# Patient Record
Sex: MALE | URBAN - NONMETROPOLITAN AREA
[De-identification: names, ages, dates, MRNs, and addresses within clinical notes are randomized per-mention and may not be internally consistent; named-entity substitution may affect disease eponyms.]

---

## 2017-01-01 ENCOUNTER — APPOINTMENT (OUTPATIENT)
Age: 62
Setting detail: DERMATOLOGY
End: 2017-01-01

## 2017-01-01 VITALS — HEART RATE: 97 BPM | DIASTOLIC BLOOD PRESSURE: 76 MMHG | SYSTOLIC BLOOD PRESSURE: 199 MMHG

## 2017-01-01 PROCEDURE — G6001 ECHO GUIDANCE RADIOTHERAPY: HCPCS

## 2017-01-01 PROCEDURE — 77401 RADIATION TX DELIVERY SUPFC: CPT

## 2017-01-01 PROCEDURE — OTHER TREATMENT REGIMEN: OTHER

## 2017-01-01 PROCEDURE — 77280 THER RAD SIMULAJ FIELD SMPL: CPT

## 2017-01-01 PROCEDURE — 77331 SPECIAL RADIATION DOSIMETRY: CPT

## 2017-01-01 PROCEDURE — OTHER FOLLOW UP FOR NEXT VISIT: OTHER

## 2017-01-01 PROCEDURE — OTHER SUPERFICIAL RADIATION TREATMENT: OTHER

## 2017-01-01 PROCEDURE — 99214 OFFICE O/P EST MOD 30 MIN: CPT | Mod: 25

## 2017-01-01 PROCEDURE — 77427 RADIATION TX MANAGEMENT X5: CPT

## 2017-01-04 PROBLEM — C44.629 SQUAMOUS CELL CARCINOMA OF SKIN OF LEFT UPPER LIMB, INCLUDING SHOULDER: Status: ACTIVE | Noted: 2017-01-01

## 2017-01-04 PROBLEM — C44.329 SQUAMOUS CELL CARCINOMA OF SKIN OF OTHER PARTS OF FACE: Status: ACTIVE | Noted: 2017-01-01

## 2017-01-04 NOTE — PROCEDURE: SUPERFICIAL RADIATION TREATMENT
Daily Fractionated Dose (Optional- Include Units) Rx 2: 255 cGy
Initial Radiation Treatment Planning (Will Render If Bill Simulation = Yes): The patient had a complete consultation regarding all applicable modalities for the treatment of their skin cancer and based on a variety of factors including the type of tumor, size, and location, the relevant medical history as well as local tissue factors, the functional status of the individual, the ability to perform necessary postoperative wound instructions and the need for simultaneous treatments as well as overall wound healing status, it was determined that the patient would begin radiation therapy treatment for skin cancer.  A full simulation and treatment device design was performed including the determination and formulation of appropriate simple and complex devices including lead shield of 0.762 mm thickness to form molded customized shielding to specifically correlate with the lesion size including treatment margin.  The custom lead shield is adequate to accommodate the appropriate applicator and provide adequate shielding around the treatment site.  The specific field applicator, shields, and devices both simple and complex as well as the specific patient setup is outlined below.  The patient was given a full consent for superficial radiation to both verbally and in writing and the full determination of patient's eligibility for treatment and selection is outlined on the patient eligibility and treatment selection form.  The specific superficial radiotherapy prescription was determined and was documented on the superficial radiotherapy prescription form.  A treatment calculation was also performed and documented on the treatment calculation form.  Based on the prescription, the patient was scheduled for a series of fractional treatments.
Simple Simulation Preamble Text Will Be Included With Simple Simulations (.......... Indications): Simple simulation was performed today for the following reasons:
Render Additional Prescriptions In Note?: No
Cumulative Dose In Cgy (Optional): 1256.0
Shielding Size (Optional- Include Units) Rx 2: 2.5 x 2.5
Shielding Size (Optional- Include Units): 3 x 3
Fractions / Week Rx 3: 5
Custom Shielding Preamble Text Will Not Be Included With Simple Simulations (.......... X X Y Cm): A lead shield of 0.762 mm thickness is utilized to form a molded, custom shield with a
Field Size (Applicator): 3.0 cm
Energy (Include Units): 50kV
Day Of The Week Treatment Administered: Tuesday
Computed Treatment Time In Min (Will Render The Same As Calculated Treatment Time If Left Blank): per device
Additional Prescription Justification Text: If there is any interruption in treatment exceeding 5 days please see Decay and Dose Adjustment Calculation and complete treatment under Prescription 2.
Total Number Of Fractions Rx 2: 15
Dose / Tx In Cgy (Optional): 253.11
Fractions / Week: 3
Treatment Margins In Cm: 1
Energy (Optional-Please Include Units) Rx 2: 50 kV
Bill And Render Text From Evaluation And Management Tab (Will Bill [id number]): Yes
Field Size (Applicator): 4.0 cm
Total Number Of Fractions: 20
Patient Positioning: Sitting
Depth (Optional-Please Include Units): surface
Fractionation Number (Evaluation): 10
Port Dimensions-X Axis In Cm: 0
Dimensions-X Axis In Cm: 2
Functional Status: 0 (fully active)
Treatment Margins In Cm: 0.5
Dose / Tx In Cgy (Optional): 254.98
Dose Per Fractionation In Cgy (Optional): 255
Total Dose (Optional-Please Include Units): 5,100 cGy
Assessment: Appropriate reaction
Fractions / Week Rx 2: 4
Custom Shielding Afterword Text Will Not Be Included With Simple Simulations (X X Y Cm............): port to correlate with the lesion size, including treatment margin. The custom lead shield is adequate to accommodate the appropriate applicator and provide adequate shielding around the treatment site. Additional shielding (as noted below) is used to protect sensitive, normal tissues.
Intro Statement (Will Not Render If Left Blank): The patient is undergoing superficial radiation therapy for skin cancer and presents for weekly evaluation and management.  Per protocol and as documented on the flow sheet, the patient was questioned as to subjective redness, pruritus, pain, drainage, fatigue, or any other symptoms.  Objectively, the radiation area was evaluated with regards to erythema, atrophy, scale, crusting, erosion, ulceration, edema, purpura, tenderness, warmth, drainage, and any other findings.  The plan was extensively reviewed including the dose, and dosing schedule.  The simulation and clinical setup was also reviewed as was the external and any internal shields and based on this review the appropriateness and sufficiency of treatment was determined.
Treatment Device Design After Initial Simulation Justification (Will Render If Bill For Treatment Devices = Yes): The patient is status post radiation simulation and is evaluated as to the use of additional devices for shielding and placement for radiation therapy.
Treatment Time In Min (Optional): 0.33
Treatment Time In Min (Optional): 0.38
Dimensions-Y Axis In Cm: 2.5
Detail Level: Detailed
Energy (Include Units): 70kV
Treatment Time In Min (Optional): 0.32
Cumulative Dose In Cgy (Optional): 1274.9
Dose / Tx In Cgy (Optional): 251.2
Cumulative Dose In Cgy (Optional): 1265.55
Dimensions-Y Axis In Cm: 1.5

## 2017-01-05 PROBLEM — C44.329 SQUAMOUS CELL CARCINOMA OF SKIN OF OTHER PARTS OF FACE: Status: ACTIVE | Noted: 2017-01-01

## 2017-01-05 PROBLEM — C44.629 SQUAMOUS CELL CARCINOMA OF SKIN OF LEFT UPPER LIMB, INCLUDING SHOULDER: Status: ACTIVE | Noted: 2017-01-01

## 2017-01-05 NOTE — PROCEDURE: TREATMENT REGIMEN
Detail Level: Zone
Plan: ULTRASOUND DEPTH N/A DUE TO LESION LOCATION\\nRT EYE DOSIMETER READING 0.14 CGY

## 2017-01-05 NOTE — PROCEDURE: TREATMENT REGIMEN
Plan: Patient's treatment areas were evaluated based on current prescribed dose for response and reaction by radiation therapist. Appropriate response and reaction noted.
Detail Level: Zone
Plan: Tumor depth on US1.62mm
Plan: inflammation depth on US 1.06 mm
Plan: Ultrasound depth N/A\\nRt eye dosimeter readin.14cGy

## 2017-01-05 NOTE — PROCEDURE: SUPERFICIAL RADIATION TREATMENT
Additional Prescription Justification Text: If there is any interruption in treatment exceeding 5 days please see Decay and Dose Adjustment Calculation and complete treatment under Prescription 2.
Fractions / Week Rx 4: 5
Total Dose (Optional-Please Include Units): 5,100 cGy
Initial Radiation Treatment Planning (Will Render If Bill Simulation = Yes): The patient had a complete consultation regarding all applicable modalities for the treatment of their skin cancer and based on a variety of factors including the type of tumor, size, and location, the relevant medical history as well as local tissue factors, the functional status of the individual, the ability to perform necessary postoperative wound instructions and the need for simultaneous treatments as well as overall wound healing status, it was determined that the patient would begin radiation therapy treatment for skin cancer.  A full simulation and treatment device design was performed including the determination and formulation of appropriate simple and complex devices including lead shield of 0.762 mm thickness to form molded customized shielding to specifically correlate with the lesion size including treatment margin.  The custom lead shield is adequate to accommodate the appropriate applicator and provide adequate shielding around the treatment site.  The specific field applicator, shields, and devices both simple and complex as well as the specific patient setup is outlined below.  The patient was given a full consent for superficial radiation to both verbally and in writing and the full determination of patient's eligibility for treatment and selection is outlined on the patient eligibility and treatment selection form.  The specific superficial radiotherapy prescription was determined and was documented on the superficial radiotherapy prescription form.  A treatment calculation was also performed and documented on the treatment calculation form.  Based on the prescription, the patient was scheduled for a series of fractional treatments.
Bill And Render Text From Evaluation And Management Tab (Will Bill [id number]): No
Port Dimensions-X Axis In Cm: 0
Field Size (Applicator): 4.0 cm
Daily Fractionated Dose (Optional- Include Units) Rx 2: 255 cGy
Shielding Size (Optional- Include Units) Rx 2: 2.5 x 2.5
Number Of Treatment Days: 1
Energy (Optional-Please Include Units) Rx 2: 50 kV
Assessment: Appropriate reaction
Energy (Include Units): 50kV
Custom Shielding Preamble Text Will Not Be Included With Simple Simulations (.......... X X Y Cm): A lead shield of 0.762 mm thickness is utilized to form a molded, custom shield with a
Fractions / Week Rx 2: 4
Field Number: 2
Fractionation Number: 11
Depth (Optional-Please Include Units): surface
Shielding Size (Optional- Include Units): 3 x 3
Day Of The Week Treatment Administered: Thursday
Total Number Of Fractions Rx 4: 15
Fractions / Week: 3
Total Number Of Fractions: 20
Dose / Tx In Cgy (Optional): 253.11
Intro Statement (Will Not Render If Left Blank): The patient is undergoing superficial radiation therapy for skin cancer and presents for weekly evaluation and management.  Per protocol and as documented on the flow sheet, the patient was questioned as to subjective redness, pruritus, pain, drainage, fatigue, or any other symptoms.  Objectively, the radiation area was evaluated with regards to erythema, atrophy, scale, crusting, erosion, ulceration, edema, purpura, tenderness, warmth, drainage, and any other findings.  The plan was extensively reviewed including the dose, and dosing schedule.  The simulation and clinical setup was also reviewed as was the external and any internal shields and based on this review the appropriateness and sufficiency of treatment was determined.
Simple Simulation Preamble Text Will Be Included With Simple Simulations (.......... Indications): Simple simulation was performed today for the following reasons:
Cumulative Dose In Cgy (Optional): 2763.2
Functional Status: 0 (fully active)
Computed Treatment Time In Min (Will Render The Same As Calculated Treatment Time If Left Blank): per device
Field Size (Applicator) Rx 2: 3.0 cm
Bill For Radiation Treatment: Yes
Treatment Device Design After Initial Simulation Justification (Will Render If Bill For Treatment Devices = Yes): The patient is status post radiation simulation and is evaluated as to the use of additional devices for shielding and placement for radiation therapy.
Custom Shielding Afterword Text Will Not Be Included With Simple Simulations (X X Y Cm............): port to correlate with the lesion size, including treatment margin. The custom lead shield is adequate to accommodate the appropriate applicator and provide adequate shielding around the treatment site. Additional shielding (as noted below) is used to protect sensitive, normal tissues.
Patient Positioning: Sitting
Dose / Tx In Cgy (Optional): 251.2
Detail Level: Detailed
Cumulative Dose In Cgy (Optional): 2784.21
Computed Treatment Time In Min (Will Render The Same As Calculated Treatment Time If Left Blank): 0.32
Treatment Time In Min (Optional): 0.33
Treatment Margins In Cm: 0.5
Cumulative Dose In Cgy (Optional): 2782.1
Dimensions-Y Axis In Cm: 2.5
Dose Per Fractionation In Cgy (Optional): 255
Dimensions-Y Axis In Cm: 1.5

## 2017-01-05 NOTE — PROCEDURE: SUPERFICIAL RADIATION TREATMENT
Functional Status: 1 (ambulatory, light activity)
Daily Fractionated Dose (Optional- Include Units): 253.11
Fractionation Number: 11
Total Number Of Fractions Rx 3: 15
Field Size (Applicator): 2.5 cm
Render Additional Prescriptions In Note?: No
Number Of Treatment Days: 1
Port Dimensions-Y Axis In Cm: 0
Cumulative Dose In Cgy (Optional): 2784.21
Initial Radiation Treatment Planning (Will Render If Bill Simulation = Yes): The patient had a complete consultation regarding all applicable modalities for the treatment of their skin cancer and based on a variety of factors including the type of tumor, size, and location, the relevant medical history as well as local tissue factors, the functional status of the individual, the ability to perform necessary postoperative wound instructions and the need for simultaneous treatments as well as overall wound healing status, it was determined that the patient would begin radiation therapy treatment for skin cancer.  A full simulation and treatment device design was performed including the determination and formulation of appropriate simple and complex devices including lead shield of 0.762 mm thickness to form molded customized shielding to specifically correlate with the lesion size including treatment margin.  The custom lead shield is adequate to accommodate the appropriate applicator and provide adequate shielding around the treatment site.  The specific field applicator, shields, and devices both simple and complex as well as the specific patient setup is outlined below.  The patient was given a full consent for superficial radiation to both verbally and in writing and the full determination of patient's eligibility for treatment and selection is outlined on the patient eligibility and treatment selection form.  The specific superficial radiotherapy prescription was determined and was documented on the superficial radiotherapy prescription form.  A treatment calculation was also performed and documented on the treatment calculation form.  Based on the prescription, the patient was scheduled for a series of fractional treatments.
Day Of The Week Treatment Administered: Thursday
Shielding Size (Optional- Include Units) Rx 2: None
Fractions / Week Rx 2: 3
Custom Shielding Preamble Text Will Not Be Included With Simple Simulations (.......... X X Y Cm): A lead shield of 0.762 mm thickness is utilized to form a molded, custom shield with a
Additional Prescription Justification Text: If there is any interruption in treatment exceeding 5 days please see Decay and Dose Adjustment Calculation and complete treatment under Prescription 2.
Energy (Optional-Please Include Units): 50kV
Patient Positioning: Sitting
Fractions / Week Rx 4: 5
Treatment Time / Fractionation (Optional- Include Units): .33 min
Total Number Of Fractions Rx 2: 20
Total Dose (Optional-Please Include Units): 5062.2 cGy
Treatment Time In Min (Optional): 0.33
Detail Level: Detailed
Treatment Margins In Cm: 0.5
Body Location Override (Optional): Right superior medial malar cheek
Custom Shielding Afterword Text Will Not Be Included With Simple Simulations (X X Y Cm............): port to correlate with the lesion size, including treatment margin. The custom lead shield is adequate to accommodate the appropriate applicator and provide adequate shielding around the treatment site. Additional shielding (as noted below) is used to protect sensitive, normal tissues.
Intro Statement (Will Not Render If Left Blank): The patient is undergoing superficial radiation therapy for skin cancer and presents for weekly evaluation and management.  Per protocol and as documented on the flow sheet, the patient was questioned as to subjective redness, pruritus, pain, drainage, fatigue, or any other symptoms.  Objectively, the radiation area was evaluated with regards to erythema, atrophy, scale, crusting, erosion, ulceration, edema, purpura, tenderness, warmth, drainage, and any other findings.  The plan was extensively reviewed including the dose, and dosing schedule.  The simulation and clinical setup was also reviewed as was the external and any internal shields and based on this review the appropriateness and sufficiency of treatment was determined.
Treatment Device Design After Initial Simulation Justification (Will Render If Bill For Treatment Devices = Yes): The patient is status post radiation simulation and is evaluated as to the use of additional devices for shielding and placement for radiation therapy.
Assessment: Appropriate reaction
Simple Simulation Preamble Text Will Be Included With Simple Simulations (.......... Indications): Simple simulation was performed today for the following reasons:
Dimensions-Y Axis In Cm: 1.5
Time Dose Fractionation (Optional- Include Units If Applicable): 86
Bill For Radiation Treatment: Yes

## 2017-01-06 PROBLEM — C44.629 SQUAMOUS CELL CARCINOMA OF SKIN OF LEFT UPPER LIMB, INCLUDING SHOULDER: Status: ACTIVE | Noted: 2017-01-01

## 2017-01-06 PROBLEM — E13.9 OTHER SPECIFIED DIABETES MELLITUS WITHOUT COMPLICATIONS: Status: ACTIVE | Noted: 2017-01-01

## 2017-01-06 PROBLEM — C44.329 SQUAMOUS CELL CARCINOMA OF SKIN OF OTHER PARTS OF FACE: Status: ACTIVE | Noted: 2017-01-01

## 2017-01-06 NOTE — PROCEDURE: SUPERFICIAL RADIATION TREATMENT
Fractions / Week Rx 4: 5
Include Rx 2 When Rendering Additional Prescriptions: No
Shielding Size (Optional- Include Units): 3 x 3
Field Size (Applicator): 3.0 cm
Field Size (Applicator): 4.0 cm
Cumulative Dose In Cgy (Optional): 3033.3
Additional Prescription Justification Text: If there is any interruption in treatment exceeding 5 days please see Decay and Dose Adjustment Calculation and complete treatment under Prescription 2.
Intro Statement (Will Not Render If Left Blank): The patient is undergoing superficial radiation therapy for skin cancer and presents for weekly evaluation and management.  Per protocol and as documented on the flow sheet, the patient was questioned as to subjective redness, pruritus, pain, drainage, fatigue, or any other symptoms.  Objectively, the radiation area was evaluated with regards to erythema, atrophy, scale, crusting, erosion, ulceration, edema, purpura, tenderness, warmth, drainage, and any other findings.  The plan was extensively reviewed including the dose, and dosing schedule.  The simulation and clinical setup was also reviewed as was the external and any internal shields and based on this review the appropriateness and sufficiency of treatment was determined.
Field Number: 3
Dose / Tx In Cgy (Optional): 253.11
Bill For Radiation Treatment: Yes
Daily Fractionated Dose (Optional- Include Units) Rx 2: 255 cGy
Port Dimensions-Y Axis In Cm: 0
Custom Shielding Preamble Text Will Not Be Included With Simple Simulations (.......... X X Y Cm): A lead shield of 0.762 mm thickness is utilized to form a molded, custom shield with a
Assessment: Appropriate reaction
Fractionation Number: 12
Dimensions-X Axis In Cm: 2
Prescription Used: 1
Shielding Size (Optional- Include Units) Rx 2: 2.5 x 2.5
Custom Shielding Afterword Text Will Not Be Included With Simple Simulations (X X Y Cm............): port to correlate with the lesion size, including treatment margin. The custom lead shield is adequate to accommodate the appropriate applicator and provide adequate shielding around the treatment site. Additional shielding (as noted below) is used to protect sensitive, normal tissues.
Energy (Optional-Please Include Units): 50kV
Energy (Optional-Please Include Units) Rx 2: 50 kV
Initial Radiation Treatment Planning (Will Render If Bill Simulation = Yes): The patient had a complete consultation regarding all applicable modalities for the treatment of their skin cancer and based on a variety of factors including the type of tumor, size, and location, the relevant medical history as well as local tissue factors, the functional status of the individual, the ability to perform necessary postoperative wound instructions and the need for simultaneous treatments as well as overall wound healing status, it was determined that the patient would begin radiation therapy treatment for skin cancer.  A full simulation and treatment device design was performed including the determination and formulation of appropriate simple and complex devices including lead shield of 0.762 mm thickness to form molded customized shielding to specifically correlate with the lesion size including treatment margin.  The custom lead shield is adequate to accommodate the appropriate applicator and provide adequate shielding around the treatment site.  The specific field applicator, shields, and devices both simple and complex as well as the specific patient setup is outlined below.  The patient was given a full consent for superficial radiation to both verbally and in writing and the full determination of patient's eligibility for treatment and selection is outlined on the patient eligibility and treatment selection form.  The specific superficial radiotherapy prescription was determined and was documented on the superficial radiotherapy prescription form.  A treatment calculation was also performed and documented on the treatment calculation form.  Based on the prescription, the patient was scheduled for a series of fractional treatments.
Fractions / Week Rx 2: 4
Total Number Of Fractions: 20
Treatment Time In Min (Optional): 0.32
Dose / Tx In Cgy (Optional): 251.2
Cumulative Dose In Cgy (Optional): 3014.4
Computed Treatment Time In Min (Will Render The Same As Calculated Treatment Time If Left Blank): per device
Treatment Device Design After Initial Simulation Justification (Will Render If Bill For Treatment Devices = Yes): The patient is status post radiation simulation and is evaluated as to the use of additional devices for shielding and placement for radiation therapy.
Cumulative Dose In Cgy (Optional): 3037.32
Total Number Of Fractions Rx 2: 15
Day Of The Week Treatment Administered: Friday
Total Dose (Optional-Please Include Units): 5,100 cGy
Treatment Time In Min (Optional): 0.33
Depth (Optional-Please Include Units): surface
Detail Level: Detailed
Simple Simulation Preamble Text Will Be Included With Simple Simulations (.......... Indications): Simple simulation was performed today for the following reasons:
Treatment Margins In Cm: 0.5
Dimensions-Y Axis In Cm: 1.5
Patient Positioning: Sitting
Functional Status: 0 (fully active)
Dose Per Fractionation In Cgy (Optional): 255
Dimensions-Y Axis In Cm: 2.5

## 2017-01-06 NOTE — PROCEDURE: TREATMENT REGIMEN
Plan: Ultrasound depth N/A\\nRt eye dosimeter readin.21cGy
Plan: Tumor depth on US1.35mm
Detail Level: Zone
Plan: inflammation depth on US 0.87mm
Plan: Patient's treatment areas were evaluated based on current prescribed dose for response and reaction by radiation therapist. Appropriate response and reaction noted.

## 2017-01-16 PROBLEM — C44.329 SQUAMOUS CELL CARCINOMA OF SKIN OF OTHER PARTS OF FACE: Status: ACTIVE | Noted: 2017-01-01

## 2017-01-16 PROBLEM — C44.629 SQUAMOUS CELL CARCINOMA OF SKIN OF LEFT UPPER LIMB, INCLUDING SHOULDER: Status: ACTIVE | Noted: 2017-01-01

## 2017-01-16 NOTE — PROCEDURE: SUPERFICIAL RADIATION TREATMENT
Intro Statement (Will Not Render If Left Blank): The patient is undergoing superficial radiation therapy for skin cancer and presents for weekly evaluation and management.  Per protocol and as documented on the flow sheet, the patient was questioned as to subjective redness, pruritus, pain, drainage, fatigue, or any other symptoms.  Objectively, the radiation area was evaluated with regards to erythema, atrophy, scale, crusting, erosion, ulceration, edema, purpura, tenderness, warmth, drainage, and any other findings.  The plan was extensively reviewed including the dose, and dosing schedule.  The simulation and clinical setup was also reviewed as was the external and any internal shields and based on this review the appropriateness and sufficiency of treatment was determined.
Treatment Time In Min (Optional): 0.32
Energy (Optional-Please Include Units): 50kV
Bill For Treatment Devices Only: No
Number Of Treatment Days: 1
Fractions / Week Rx 3: 5
Simple Simulation Preamble Text Will Be Included With Simple Simulations (.......... Indications): Simple simulation was performed today for the following reasons:
Shielding Size (Optional- Include Units): 3 x 3
Bill For Radiation Treatment: Yes
Port Dimensions-Y Axis In Cm: 0
Day Of The Week Treatment Administered: Monday
Dose / Tx In Cgy (Optional): 251.2
Assessment: Appropriate reaction
Computed Treatment Time In Min (Will Render The Same As Calculated Treatment Time If Left Blank): per device
Field Size (Applicator): 4.0 cm
Fractions / Week Rx 2: 4
Functional Status: 0 (fully active)
Custom Shielding Preamble Text Will Not Be Included With Simple Simulations (.......... X X Y Cm): A lead shield of 0.762 mm thickness is utilized to form a molded, custom shield with a
Daily Fractionated Dose (Optional- Include Units) Rx 2: 255 cGy
Depth (Optional-Please Include Units): surface
Custom Shielding Afterword Text Will Not Be Included With Simple Simulations (X X Y Cm............): port to correlate with the lesion size, including treatment margin. The custom lead shield is adequate to accommodate the appropriate applicator and provide adequate shielding around the treatment site. Additional shielding (as noted below) is used to protect sensitive, normal tissues.
Total Number Of Fractions Rx 2: 15
Initial Radiation Treatment Planning (Will Render If Bill Simulation = Yes): The patient had a complete consultation regarding all applicable modalities for the treatment of their skin cancer and based on a variety of factors including the type of tumor, size, and location, the relevant medical history as well as local tissue factors, the functional status of the individual, the ability to perform necessary postoperative wound instructions and the need for simultaneous treatments as well as overall wound healing status, it was determined that the patient would begin radiation therapy treatment for skin cancer.  A full simulation and treatment device design was performed including the determination and formulation of appropriate simple and complex devices including lead shield of 0.762 mm thickness to form molded customized shielding to specifically correlate with the lesion size including treatment margin.  The custom lead shield is adequate to accommodate the appropriate applicator and provide adequate shielding around the treatment site.  The specific field applicator, shields, and devices both simple and complex as well as the specific patient setup is outlined below.  The patient was given a full consent for superficial radiation to both verbally and in writing and the full determination of patient's eligibility for treatment and selection is outlined on the patient eligibility and treatment selection form.  The specific superficial radiotherapy prescription was determined and was documented on the superficial radiotherapy prescription form.  A treatment calculation was also performed and documented on the treatment calculation form.  Based on the prescription, the patient was scheduled for a series of fractional treatments.
Fractionation Number: 13
Treatment Device Design After Initial Simulation Justification (Will Render If Bill For Treatment Devices = Yes): The patient is status post radiation simulation and is evaluated as to the use of additional devices for shielding and placement for radiation therapy.
Energy (Optional-Please Include Units) Rx 2: 50 kV
Shielding Size (Optional- Include Units) Rx 2: 2.5 x 2.5
Patient Positioning: Sitting
Fractions / Week: 3
Dose Per Fractionation In Cgy (Optional): 255
Detail Level: Detailed
Dose / Tx In Cgy (Optional): 253.11
Field Size (Applicator) Rx 2: 3.0 cm
Total Dose (Optional-Please Include Units): 5,100 cGy
Cumulative Dose In Cgy (Optional): 3265.6
Treatment Margins In Cm: 0.5
Treatment Time In Min (Optional): 0.33
Dimensions-Y Axis In Cm: 2.5
Dimensions-Y Axis In Cm: 1.5
Cumulative Dose In Cgy (Optional): 3284.5
Additional Prescription Justification Text: If there is any interruption in treatment exceeding 5 days please see Decay and Dose Adjustment Calculation and complete treatment under Prescription 2.
Field Number: 2
Total Number Of Fractions: 20
Cumulative Dose In Cgy (Optional): 3290.43

## 2017-01-16 NOTE — PROCEDURE: TREATMENT REGIMEN
Detail Level: Zone
Plan: Patient's treatment areas were evaluated based on current prescribed dose for response and reaction by radiation therapist. Appropriate response and reaction noted.
Plan: Ultrasound depth N/A\\nRt eye dosimeter readin.21cGy
Plan: Tumor depth on US1.22mm
Plan: inflammation depth on US 1.16mm

## 2017-01-17 PROBLEM — C44.629 SQUAMOUS CELL CARCINOMA OF SKIN OF LEFT UPPER LIMB, INCLUDING SHOULDER: Status: ACTIVE | Noted: 2017-01-01

## 2017-01-17 PROBLEM — C44.329 SQUAMOUS CELL CARCINOMA OF SKIN OF OTHER PARTS OF FACE: Status: ACTIVE | Noted: 2017-01-01

## 2017-01-17 NOTE — PROCEDURE: TREATMENT REGIMEN
Detail Level: Zone
Plan: Ultrasound depth N/A\\nRt eye dosimeter readin.0 cGy
Plan: inflammation depth on US 0.78 mm
Plan: Patient's treatment areas were evaluated based on current prescribed dose for response and reaction by radiation therapist. Appropriate response and reaction noted.
Plan: Tumor depth on US1.25 mm

## 2017-01-17 NOTE — PROCEDURE: SUPERFICIAL RADIATION TREATMENT
Render Patient Eligibility And Selection In Note?: No
Fractions / Week Rx 4: 5
Bill For Radiation Treatment: Yes
Field Size (Applicator): 4.0 cm
Total Number Of Fractions: 20
Prescription Used: 1
Daily Fractionated Dose (Optional- Include Units) Rx 2: 255 cGy
Simple Simulation Preamble Text Will Be Included With Simple Simulations (.......... Indications): Simple simulation was performed today for the following reasons:
Energy (Include Units): 50kV
Custom Shielding Afterword Text Will Not Be Included With Simple Simulations (X X Y Cm............): port to correlate with the lesion size, including treatment margin. The custom lead shield is adequate to accommodate the appropriate applicator and provide adequate shielding around the treatment site. Additional shielding (as noted below) is used to protect sensitive, normal tissues.
Treatment Device Design After Initial Simulation Justification (Will Render If Bill For Treatment Devices = Yes): The patient is status post radiation simulation and is evaluated as to the use of additional devices for shielding and placement for radiation therapy.
Intro Statement (Will Not Render If Left Blank): The patient is undergoing superficial radiation therapy for skin cancer and presents for weekly evaluation and management.  Per protocol and as documented on the flow sheet, the patient was questioned as to subjective redness, pruritus, pain, drainage, fatigue, or any other symptoms.  Objectively, the radiation area was evaluated with regards to erythema, atrophy, scale, crusting, erosion, ulceration, edema, purpura, tenderness, warmth, drainage, and any other findings.  The plan was extensively reviewed including the dose, and dosing schedule.  The simulation and clinical setup was also reviewed as was the external and any internal shields and based on this review the appropriateness and sufficiency of treatment was determined.
Energy (Optional-Please Include Units) Rx 2: 50 kV
Depth (Optional-Please Include Units): surface
Total Dose (Optional-Please Include Units): 5,100 cGy
Functional Status: 0 (fully active)
Total Number Of Fractions Rx 3: 15
Field Size (Applicator) Rx 2: 3.0 cm
Fractions / Week: 3
Port Dimensions-X Axis In Cm: 0
Treatment Time In Min (Optional): 0.33
Fractions / Week Rx 2: 4
Dimensions-Y Axis In Cm: 1.5
Cumulative Dose In Cgy (Optional): 3516.8
Patient Positioning: Sitting
Simple Simulation Afterword Text Will Be Included With Simple Simulations (Indications............): The patient had a complete consultation regarding all applicable modalities for the treatment of their skin cancer and based on a variety of factors including the type of tumor, size, and location, the relevant medical history as well as local tissue factors, the functional status of the individual, the ability to perform necessary postoperative wound instructions and the need for simultaneous treatments as well as overall wound healing status, it was determined that the patient would begin radiation therapy treatment for skin cancer.  A full simulation and treatment device design was performed including the determination and formulation of appropriate simple and complex devices including lead shield of 0.762 mm thickness to form molded customized shielding to specifically correlate with the lesion size including treatment margin.  The custom lead shield is adequate to accommodate the appropriate applicator and provide adequate shielding around the treatment site.  The specific field applicator, shields, and devices both simple and complex as well as the specific patient setup is outlined below.  The patient was given a full consent for superficial radiation to both verbally and in writing and the full determination of patient's eligibility for treatment and selection is outlined on the patient eligibility and treatment selection form.  The specific superficial radiotherapy prescription was determined and was documented on the superficial radiotherapy prescription form.  A treatment calculation was also performed and documented on the treatment calculation form.  Based on the prescription, the patient was scheduled for a series of fractional treatments.
Assessment: Appropriate reaction
Dose Per Fractionation In Cgy (Optional): 251.2
Custom Shielding Preamble Text Will Not Be Included With Simple Simulations (.......... X X Y Cm): A lead shield of 0.762 mm thickness is utilized to form a molded, custom shield with a
Cumulative Dose In Cgy (Optional): 3535.7
Shielding Size (Optional- Include Units): 3 x 3
Computed Treatment Time In Min (Will Render The Same As Calculated Treatment Time If Left Blank): per device
Detail Level: Detailed
Day Of The Week Treatment Administered: Tuesday
Additional Prescription Justification Text: If there is any interruption in treatment exceeding 5 days please see Decay and Dose Adjustment Calculation and complete treatment under Prescription 2.
Shielding Size (Optional- Include Units) Rx 2: 2.5 x 2.5
Dimensions-X Axis In Cm: 2
Cumulative Dose In Cgy (Optional): 3543.54
Treatment Time In Min (Optional): 0.32
Dimensions-Y Axis In Cm: 2.5
Fractionation Number: 14
Treatment Margins In Cm: 0.5
Dose Per Fractionation In Cgy (Optional): 255
Dose / Tx In Cgy (Optional): 253.11

## 2017-01-18 PROBLEM — C44.629 SQUAMOUS CELL CARCINOMA OF SKIN OF LEFT UPPER LIMB, INCLUDING SHOULDER: Status: ACTIVE | Noted: 2017-01-01

## 2017-01-18 PROBLEM — C44.329 SQUAMOUS CELL CARCINOMA OF SKIN OF OTHER PARTS OF FACE: Status: ACTIVE | Noted: 2017-01-01

## 2017-01-18 NOTE — PROCEDURE: TREATMENT REGIMEN
Detail Level: Zone
Plan: Tumor depth on US 0.74 mm
Plan: Patient's treatment areas were evaluated based on current prescribed dose for response and reaction by radiation therapist. Appropriate response and reaction noted.
Plan: Ultrasound depth N/A\\nRt eye dosimeter readin.0 cGy
Plan: inflammation depth on US 0.78 mm

## 2017-01-18 NOTE — PROCEDURE: SUPERFICIAL RADIATION TREATMENT
Total Dose (Optional-Please Include Units): 5,100 cGy
Daily Fractionated Dose (Optional- Include Units) Rx 2: 255 cGy
Fractions / Week: 3
Energy (Include Units): 50kV
Custom Shielding Preamble Text Will Not Be Included With Simple Simulations (.......... X X Y Cm): A lead shield of 0.762 mm thickness is utilized to form a molded, custom shield with a
Fractions / Week Rx 3: 5
Bill For Treatment Devices Only: No
Field Number: 2
Assessment: Appropriate reaction
Day Of The Week Treatment Administered: Wednesday
Field Size (Applicator) Rx 2: 3.0 cm
Total Number Of Fractions Rx 2: 15
Dose Per Fractionation In Cgy (Optional): 251.2
Custom Shielding Afterword Text Will Not Be Included With Simple Simulations (X X Y Cm............): port to correlate with the lesion size, including treatment margin. The custom lead shield is adequate to accommodate the appropriate applicator and provide adequate shielding around the treatment site. Additional shielding (as noted below) is used to protect sensitive, normal tissues.
Prescription Used: 1
Treatment Device Design After Initial Simulation Justification (Will Render If Bill For Treatment Devices = Yes): The patient is status post radiation simulation and is evaluated as to the use of additional devices for shielding and placement for radiation therapy.
Port Dimensions-X Axis In Cm: 0
Field Size (Applicator): 4.0 cm
Simple Simulation Preamble Text Will Be Included With Simple Simulations (.......... Indications): Simple simulation was performed today for the following reasons:
Energy (Optional-Please Include Units) Rx 2: 50 kV
Intro Statement (Will Not Render If Left Blank): The patient is undergoing superficial radiation therapy for skin cancer and presents for weekly evaluation and management.  Per protocol and as documented on the flow sheet, the patient was questioned as to subjective redness, pruritus, pain, drainage, fatigue, or any other symptoms.  Objectively, the radiation area was evaluated with regards to erythema, atrophy, scale, crusting, erosion, ulceration, edema, purpura, tenderness, warmth, drainage, and any other findings.  The plan was extensively reviewed including the dose, and dosing schedule.  The simulation and clinical setup was also reviewed as was the external and any internal shields and based on this review the appropriateness and sufficiency of treatment was determined.
Shielding Size (Optional- Include Units): 3 x 3
Cumulative Dose In Cgy (Optional): 3768.0
Treatment Margins In Cm: 0.5
Simple Simulation Afterword Text Will Be Included With Simple Simulations (Indications............): The patient had a complete consultation regarding all applicable modalities for the treatment of their skin cancer and based on a variety of factors including the type of tumor, size, and location, the relevant medical history as well as local tissue factors, the functional status of the individual, the ability to perform necessary postoperative wound instructions and the need for simultaneous treatments as well as overall wound healing status, it was determined that the patient would begin radiation therapy treatment for skin cancer.  A full simulation and treatment device design was performed including the determination and formulation of appropriate simple and complex devices including lead shield of 0.762 mm thickness to form molded customized shielding to specifically correlate with the lesion size including treatment margin.  The custom lead shield is adequate to accommodate the appropriate applicator and provide adequate shielding around the treatment site.  The specific field applicator, shields, and devices both simple and complex as well as the specific patient setup is outlined below.  The patient was given a full consent for superficial radiation to both verbally and in writing and the full determination of patient's eligibility for treatment and selection is outlined on the patient eligibility and treatment selection form.  The specific superficial radiotherapy prescription was determined and was documented on the superficial radiotherapy prescription form.  A treatment calculation was also performed and documented on the treatment calculation form.  Based on the prescription, the patient was scheduled for a series of fractional treatments.
Total Number Of Fractions: 20
Additional Prescription Justification Text: If there is any interruption in treatment exceeding 5 days please see Decay and Dose Adjustment Calculation and complete treatment under Prescription 2.
Shielding Size (Optional- Include Units) Rx 2: 2.5 x 2.5
Fractions / Week Rx 2: 4
Functional Status: 0 (fully active)
Cumulative Dose In Cgy (Optional): 3786.9
Detail Level: Detailed
Computed Treatment Time In Min (Will Render The Same As Calculated Treatment Time If Left Blank): per device
Dose / Tx In Cgy (Optional): 253.11
Treatment Time In Min (Optional): 0.32
Patient Positioning: Sitting
Dimensions-Y Axis In Cm: 1.5
Cumulative Dose In Cgy (Optional): 3796.65
Dose Per Fractionation In Cgy (Optional): 255
Bill For Radiation Treatment: Yes
Treatment Time In Min (Optional): 0.33
Dimensions-Y Axis In Cm: 2.5
Depth (Optional-Please Include Units): surface